# Patient Record
Sex: FEMALE | Race: WHITE | HISPANIC OR LATINO | Employment: UNEMPLOYED | URBAN - METROPOLITAN AREA
[De-identification: names, ages, dates, MRNs, and addresses within clinical notes are randomized per-mention and may not be internally consistent; named-entity substitution may affect disease eponyms.]

---

## 2023-03-15 ENCOUNTER — OFFICE VISIT (OUTPATIENT)
Dept: ORTHOPEDIC SURGERY | Facility: CLINIC | Age: 48
End: 2023-03-15
Payer: OTHER GOVERNMENT

## 2023-03-15 VITALS — OXYGEN SATURATION: 98 % | HEART RATE: 89 BPM | BODY MASS INDEX: 24.96 KG/M2 | HEIGHT: 67 IN | WEIGHT: 159 LBS

## 2023-03-15 DIAGNOSIS — M25.522 PAIN OF BOTH ELBOWS: Primary | ICD-10-CM

## 2023-03-15 DIAGNOSIS — M25.521 PAIN OF BOTH ELBOWS: Primary | ICD-10-CM

## 2023-03-15 PROCEDURE — 99203 OFFICE O/P NEW LOW 30 MIN: CPT | Performed by: STUDENT IN AN ORGANIZED HEALTH CARE EDUCATION/TRAINING PROGRAM

## 2023-03-15 RX ORDER — UREA 10 %
LOTION (ML) TOPICAL
COMMUNITY

## 2023-03-15 RX ORDER — FLUOXETINE 10 MG/1
CAPSULE ORAL
COMMUNITY
Start: 2023-02-23

## 2023-03-15 RX ORDER — MELOXICAM 15 MG/1
TABLET ORAL
COMMUNITY
Start: 2023-02-27 | End: 2023-04-07 | Stop reason: ALTCHOICE

## 2023-03-15 RX ORDER — LAMOTRIGINE 25 MG/1
TABLET ORAL
COMMUNITY
Start: 2023-02-13

## 2023-03-15 NOTE — PROGRESS NOTES
"Chief Complaint  Pain and Initial Evaluation of the Left Elbow and Pain and Initial Evaluation of the Right Elbow    Subjective          Brissa Yousif presents to [facility] for evaluation of bilateral elbow pain.     History of Present Illness    Patient presents to the office for evaluation of bilateral elbow pain. She has a work up on her arm back in 2016 and had a benign bone and she is seeing a specialist for this currently. She reports pain to both the medial and lateral side. She reports the pain has been bothering for about a year. She reports she does a lot of typing due to being in school. She is right hand dominant. She reports she has been taking Mobic for three months and states she doesn't seem to seen any relief.    Allergies   Allergen Reactions   • Amoxicillin-Pot Clavulanate Diarrhea        Social History     Socioeconomic History   • Marital status:    Tobacco Use   • Smoking status: Never     Passive exposure: Yes   • Smokeless tobacco: Never   • Tobacco comments:     Mother smoked throughout childhood   Substance and Sexual Activity   • Alcohol use: Not Currently     Alcohol/week: 1.0 standard drink     Types: 1 Glasses of wine per week     Comment: I drink alcohol maybe once every few months.   • Drug use: Never   • Sexual activity: Yes     Partners: Male     Birth control/protection: I.U.D.        I reviewed the patient's chief complaint, history of present illness, review of systems, past medical history, surgical history, family history, social history, medications, and allergy list.     REVIEW OF SYSTEMS    Constitutional: Denies fevers, chills, weight loss  Cardiovascular: Denies chest pain, shortness of breath  Skin: Denies rashes, acute skin changes  Neurologic: Denies headache, loss of consciousness  MSK: bilateral elbow pain       Objective   Vital Signs:   Pulse 89   Ht 170.2 cm (67\")   Wt 72.1 kg (159 lb)   SpO2 98%   BMI 24.90 kg/m²     Body " mass index is 24.9 kg/m².    Physical Exam    General: Alert. No acute distress.   Right upper extremity: tender over the lateral epicondyle, tender over the common extensor, full elbow range of motion, pain with finger restiricting, pain with finger restricting flexion, full active finger range of motion,     Left upper extremity: tender over the common extensor and lateral epicondyle, full elbow range of motion, pain with finger restaint and flexion, neurovascularly intact, sensation intact, full active finger range of motion.       Procedures    Imaging Results (Most Recent)     None                   Assessment and Plan        No results found.     Diagnoses and all orders for this visit:    1. Pain of both elbows (Primary)        Patient presents to the office for evaluation for bilateral elbow pain. Discussed the risks and benefits of a steroid injection, however, patient wishes to hold on this. Prescription for Voltaren gel will be sent in today and will order formal physical therapy. She will call with any questions or concerns.        Call or return if worsening symptoms.    Scribed for Jeferson Mendoza MD by Thao Basilio  03/15/2023   09:35 EDT         Follow Up       6 weeks     Patient was given instructions and counseling regarding her condition or for health maintenance advice. Please see specific information pulled into the AVS if appropriate.       I have personally performed the services described in this document as scribed by the above individual and it is both accurate and complete.     Jeferson Mendoza MD  03/15/23  09:45 EDT

## 2023-03-27 ENCOUNTER — PATIENT MESSAGE (OUTPATIENT)
Dept: ORTHOPEDIC SURGERY | Facility: CLINIC | Age: 48
End: 2023-03-27
Payer: OTHER GOVERNMENT

## 2023-03-27 DIAGNOSIS — M25.522 PAIN OF BOTH ELBOWS: ICD-10-CM

## 2023-03-27 DIAGNOSIS — M25.521 PAIN OF BOTH ELBOWS: ICD-10-CM

## 2023-04-06 ENCOUNTER — TREATMENT (OUTPATIENT)
Dept: PHYSICAL THERAPY | Facility: CLINIC | Age: 48
End: 2023-04-06
Payer: OTHER GOVERNMENT

## 2023-04-06 DIAGNOSIS — M25.521 BILATERAL ELBOW JOINT PAIN: Primary | ICD-10-CM

## 2023-04-06 DIAGNOSIS — R20.2 TINGLING: ICD-10-CM

## 2023-04-06 DIAGNOSIS — M25.522 BILATERAL ELBOW JOINT PAIN: Primary | ICD-10-CM

## 2023-04-06 PROCEDURE — 97165 OT EVAL LOW COMPLEX 30 MIN: CPT | Performed by: OCCUPATIONAL THERAPIST

## 2023-04-06 PROCEDURE — 97110 THERAPEUTIC EXERCISES: CPT | Performed by: OCCUPATIONAL THERAPIST

## 2023-04-06 NOTE — PROGRESS NOTES
"Occupational Therapy Initial Evaluation and Plan of Care  [place]  OT: [address] [place] [address]    Patient: Brissa Yousif   : 1975  Diagnosis/ICD-10 Code:  Bilateral elbow joint pain [M25.521, M25.522]  Referring practitioner: Jeferson Mendoza MD  Date of Initial Visit: 2023  Today's Date: 2023  Patient seen for 1 sessions           Subjective Questionnaire: QuickDASH:       Subjective Evaluation    History of Present Illness  Mechanism of injury: Pt is a RHD 48 y/o female who presents with B elbow pain and tingling in ulnar side of B hands. Pt reports symptoms have been ongoing off and on for approximately 5 years with symptoms \"getting angry\" within the past year. Pt reports good relief with Voltaren gel. Pt also reports completing yoga with some relief. Pt is currently in grad school.    Pain  Current pain ratin  At best pain ratin  At worst pain ratin  Location: B elbows  Quality: burning  Relieving factors: medications, change in position and rest  Aggravating factors: repetitive movement and prolonged positioning  Progression: improved    Social Support  Lives with: spouse    Hand dominance: right    Diagnostic Tests  X-ray: normal    Treatments  Previous treatment: medication  Current treatment: medication  Patient Goals  Patient goals for therapy: decreased pain, increased strength, independence with ADLs/IADLs and return to sport/leisure activities  Patient goal: \"I would just like to get to a point where ideally less pain more function\"           Objective          Tenderness     Left Elbow   Tenderness in the lateral epicondyle and medial epicondyle.     Right Elbow   Tenderness in the lateral epicondyle and medial epicondyle.     Left Wrist/Hand   Tenderness in the lateral epicondyle and medial epicondyle.     Right Wrist/Hand   Tenderness in the lateral epicondyle and medial epicondyle.     Neurological Testing     Additional Neurological Details  Pt scored WNL " "on monofilament testing but reports occasional tingling in B small fingers that is position dependent.    Active Range of Motion   Left Shoulder   Normal active range of motion    Right Shoulder   Normal active range of motion    Left Elbow   Normal active range of motion    Right Elbow   Normal active range of motion    Left Wrist   Normal active range of motion    Right Wrist   Normal active range of motion    Left Thumb   Opposition: Full opposition to base of small finger.    Right Thumb   Opposition: Full opposition to base of small finger.    Strength/Myotome Testing     Left Shoulder   Normal muscle strength    Right Shoulder   Normal muscle strength    Left Elbow   Normal strength    Right Elbow   Normal strength    Left Wrist/Hand   Normal wrist strength     (2nd hand position)     Trial 1: 66 lbs    Trial 2: 65 lbs    Trial 3: 63 lbs    Average: 64.67 lbs    Right Wrist/Hand   Normal wrist strength     (2nd hand position)     Trial 1: 66 lbs    Trial 2: 65 lbs    Trial 3: 63 lbs    Average: 64.67 lbs    Additional Strength Details  Eval elbows ext  R: 69, 71, 63 (67)  L: 69, 65, 61 (65)    Tests     Left Shoulder   Positive Jewel.     Left Elbow   Positive elbow flexion.     Right Elbow   Positive elbow flexion.     Left Wrist/Hand   Negative resisted middle finger and Tinel's sign (medial nerve).     Right Wrist/Hand   Positive resisted middle finger.   Negative Tinel's sign (medial nerve).     Additional Tests Details  20 seconds pt reports small finger \"started to get a little bit less sensation in it\"  Symptoms present after 1 min on both R and L sides. Pt reports change in sensation to B small fingers.          Assessment & Plan     Assessment  Impairments: activity intolerance, impaired physical strength, lacks appropriate home exercise program and pain with function  Functional Limitations: carrying objects, lifting, sleeping, pulling, pushing and uncomfortable because of pain  Assessment " details: Pt will benefit from skilled OT secondary to decreased strength/ROM and increased pain that limits pt ability to complete ADLs.  Prognosis: good    Goals  Plan Goals: 1.  Patient complains of pain in B elbows.  LTG 1  12 weeks:  Decrease B elbow pain to 1/10 with functional use to improve ADL status.  Status:  New  STG 1  8 weeks:  Decrease B elbow pain to 3/10 with functional use.  Status:  New    2.  Carrying, moving, and handling objects functional limitation.  LTG 2  12 weeks:  The patient will demonstrate 1-19 % limitation by achieving a score of 15/55 on the Quick DASH.  Status: New  STG 2  8 weeks:  The patient will demonstrate 1-19 % limitation by achieving a score of 19/55 on the Quick DASH.  Status:  New    Plan  Planned modality interventions: cryotherapy and thermotherapy (hydrocollator packs)  Planned therapy interventions: body mechanics training, fine motor coordination training, flexibility, functional ROM exercises, home exercise program, joint mobilization, manual therapy, neuromuscular re-education, postural training, soft tissue mobilization, strengthening, stretching and therapeutic activities  Frequency: 2x week  Duration in weeks: 12  Treatment plan discussed with: patient      Pt is indicated for skilled occupational therapy services.  Timed:            Therapeutic Exercise:    8     mins  16050;     Therapeutic Activity:     4     mins  42506;       Un-Timed:  Low Eval     40     Mins  52955    Timed Treatment:   12   mins   Total Treatment:     52   mins    OT SIGNATURE: Carlos Olsen OT   DATE TREATMENT INITIATED: 4/6/2023  [place] License: [id number]    Initial Certification  Certification Period: 7/4/2023  I certify that the therapy services are furnished while this patient is under my care.  The services outlined above are required by this patient, and will be reviewed every 90 days.     PHYSICIAN: Jeferson Mendoza MD      DATE:   MD NPI: [id number]  Please sign and return via fax to    [phone].. Thank you, [facility].

## 2023-04-07 NOTE — TELEPHONE ENCOUNTER
From: Brissa Yousif  To: Jeferson Mendoza  Sent: 3/27/2023 10:39 AM EDT  Subject: Diclofenac    Hi Dr. Mendoza--I just wanted to make sure that I was not supposed to be taking the mobic with the diclofenac topical gel. I have found that the gel is actually helping more than I expected--does this mean that it is more likely to help if I take it orally too? If so, I may want to switch to that instead.     Thank you,    Brissa

## 2023-04-07 NOTE — TELEPHONE ENCOUNTER
GOOD MORNING JES SCRIPT FOR ORAL DICLOFENAC AND GEL SENT TO YOUR PHARMACY. PLEASE STOP THE MELOXICAM BEFORE STARTING THE ORAL DICLOFENAC.

## 2023-04-10 ENCOUNTER — TREATMENT (OUTPATIENT)
Dept: PHYSICAL THERAPY | Facility: CLINIC | Age: 48
End: 2023-04-10
Payer: OTHER GOVERNMENT

## 2023-04-10 DIAGNOSIS — M25.522 BILATERAL ELBOW JOINT PAIN: Primary | ICD-10-CM

## 2023-04-10 DIAGNOSIS — M25.521 BILATERAL ELBOW JOINT PAIN: Primary | ICD-10-CM

## 2023-04-10 DIAGNOSIS — R20.2 TINGLING: ICD-10-CM

## 2023-04-10 PROCEDURE — 97110 THERAPEUTIC EXERCISES: CPT | Performed by: OCCUPATIONAL THERAPIST

## 2023-04-10 PROCEDURE — 97530 THERAPEUTIC ACTIVITIES: CPT | Performed by: OCCUPATIONAL THERAPIST

## 2023-04-10 NOTE — PROGRESS NOTES
Occupational Therapy Daily Treatment Note  [place] OT: [address] [place] [address]      Patient: Brissa Yousif   : 1975  Diagnosis/ICD-10 Code:  Bilateral elbow joint pain [M25.521, M25.522]  Referring practitioner: Jeferson Mendoza MD  Date of Initial Visit: Type: THERAPY  Noted: 2023  Today's Date: 4/10/2023  Patient seen for 2 sessions             Subjective   Brissa Yousif reports: 3-4/ pain B elbows.    Objective     See Exercise, Manual, and Modality Logs for complete treatment.       Assessment/Plan  Pt reports temporary exercise related soreness in forearms but tolerated treatment well.  Progress per Plan of Care           Timed:  Therapeutic Exercise:    30     mins  19423;     Therapeutic Activity:     8     mins  46188;       Timed Treatment:   38   mins   Total Treatment:     38   mins        Carlos Olsen OT  Occupational Therapist  [place] License: [id number]  NPI: [id number]

## 2023-04-12 ENCOUNTER — TREATMENT (OUTPATIENT)
Dept: PHYSICAL THERAPY | Facility: CLINIC | Age: 48
End: 2023-04-12
Payer: OTHER GOVERNMENT

## 2023-04-12 DIAGNOSIS — M25.521 BILATERAL ELBOW JOINT PAIN: Primary | ICD-10-CM

## 2023-04-12 DIAGNOSIS — R20.2 TINGLING: ICD-10-CM

## 2023-04-12 DIAGNOSIS — M25.522 BILATERAL ELBOW JOINT PAIN: Primary | ICD-10-CM

## 2023-04-12 PROCEDURE — 97530 THERAPEUTIC ACTIVITIES: CPT | Performed by: OCCUPATIONAL THERAPIST

## 2023-04-12 PROCEDURE — 97110 THERAPEUTIC EXERCISES: CPT | Performed by: OCCUPATIONAL THERAPIST

## 2023-04-12 NOTE — PROGRESS NOTES
"Occupational Therapy Daily Treatment Note  [place] OT: [address] [place] [address]      Patient: Brissa Yousif   : 1975  Diagnosis/ICD-10 Code:  Bilateral elbow joint pain [M25.521, M25.522]  Referring practitioner: Jeferson Mendoza MD  Date of Initial Visit: Type: THERAPY  Noted: 2023  Today's Date: 2023  Patient seen for 3 sessions             Subjective   Brissa Yousif reports: 5/10 pain in R elbow. The L elbow is \"maybe a 1 if anything right now\"    Objective     See Exercise, Manual, and Modality Logs for complete treatment.       Assessment/Plan  Pt tolerated well. Pt displays improved strength this date.  Progress per Plan of Care           Timed:  Therapeutic Exercise:    30     mins  47549;      Therapeutic Activity:     8     mins  01608;       Timed Treatment:   38   mins   Total Treatment:     38   mins        Carlos Olsen OT  Occupational Therapist  [place] License: [id number]  NPI: [id number]  "

## 2023-04-18 ENCOUNTER — TREATMENT (OUTPATIENT)
Dept: PHYSICAL THERAPY | Facility: CLINIC | Age: 48
End: 2023-04-18
Payer: OTHER GOVERNMENT

## 2023-04-18 DIAGNOSIS — M25.522 BILATERAL ELBOW JOINT PAIN: Primary | ICD-10-CM

## 2023-04-18 DIAGNOSIS — M25.521 BILATERAL ELBOW JOINT PAIN: Primary | ICD-10-CM

## 2023-04-18 DIAGNOSIS — R20.2 TINGLING: ICD-10-CM

## 2023-04-18 PROCEDURE — 97110 THERAPEUTIC EXERCISES: CPT | Performed by: OCCUPATIONAL THERAPIST

## 2023-04-18 PROCEDURE — 97140 MANUAL THERAPY 1/> REGIONS: CPT | Performed by: OCCUPATIONAL THERAPIST

## 2023-04-18 NOTE — PROGRESS NOTES
"Occupational Therapy Daily Treatment Note  [place] OT: [address] [place] [address]      Patient: Brissa Yousif   : 1975  Diagnosis/ICD-10 Code:  Bilateral elbow joint pain [M25.521, M25.522]  Referring practitioner: Jeferson Mendoza MD  Date of Initial Visit: Type: THERAPY  Noted: 2023  Today's Date: 2023  Patient seen for 4 sessions             Subjective   Brissa Yousif reports: \"They're a little sore today. They've been sore since the 2 pounders\" R elbow 5/10 L elbow 3/10.    Objective     See Exercise, Manual, and Modality Logs for complete treatment.       Assessment/Plan  Pt tolerated well. OT educated pt on friction massage to encourage proper healing of wrist extensors. Teachback successful.   Progress per Plan of Care           Timed:  Manual Therapy:    10     mins  69914;  Therapeutic Exercise:    15     mins  07703;         Timed Treatment:   25   mins   Total Treatment:     25   mins        Carlos Olsen OT  Occupational Therapist  [place] License: [id number]  NPI: [id number]  "

## 2023-04-21 ENCOUNTER — TREATMENT (OUTPATIENT)
Dept: PHYSICAL THERAPY | Facility: CLINIC | Age: 48
End: 2023-04-21
Payer: OTHER GOVERNMENT

## 2023-04-21 DIAGNOSIS — M25.521 BILATERAL ELBOW JOINT PAIN: Primary | ICD-10-CM

## 2023-04-21 DIAGNOSIS — M25.522 BILATERAL ELBOW JOINT PAIN: Primary | ICD-10-CM

## 2023-04-21 DIAGNOSIS — R20.2 TINGLING: ICD-10-CM

## 2023-04-21 PROCEDURE — 97530 THERAPEUTIC ACTIVITIES: CPT | Performed by: OCCUPATIONAL THERAPIST

## 2023-04-21 PROCEDURE — 97140 MANUAL THERAPY 1/> REGIONS: CPT | Performed by: OCCUPATIONAL THERAPIST

## 2023-04-21 PROCEDURE — 97110 THERAPEUTIC EXERCISES: CPT | Performed by: OCCUPATIONAL THERAPIST

## 2023-04-21 NOTE — PROGRESS NOTES
Occupational Therapy Daily Treatment Note  [place] OT: [address] [place] [address]      Patient: Brissa Yousif   : 1975  Diagnosis/ICD-10 Code:  Bilateral elbow joint pain [M25.521, M25.522]  Referring practitioner: Jeferson Mendoza MD  Date of Initial Visit: Type: THERAPY  Noted: 2023  Today's Date: 2023  Patient seen for 5 sessions             Subjective   Brissa Yousif reports: 2/10 pain R elbow. No current pain L elbow.     Objective     See Exercise, Manual, and Modality Logs for complete treatment.       Assessment/Plan  Pt tolerated strengthening well this date. Ktape applied to R lateral elbow. Education provided on safe removal of Ktape.  Progress per Plan of Care           Timed:  Manual Therapy:    16     mins  58519;  Therapeutic Exercise:    14     mins  78762;      Therapeutic Activity:     8     mins  37372;       Timed Treatment:   38   mins   Total Treatment:     38   mins        Carlos Olsen OT  Occupational Therapist  [place] License: [id number]  NPI: [id number]

## 2023-04-25 ENCOUNTER — TREATMENT (OUTPATIENT)
Dept: PHYSICAL THERAPY | Facility: CLINIC | Age: 48
End: 2023-04-25
Payer: OTHER GOVERNMENT

## 2023-04-25 DIAGNOSIS — M25.521 BILATERAL ELBOW JOINT PAIN: Primary | ICD-10-CM

## 2023-04-25 DIAGNOSIS — M25.522 BILATERAL ELBOW JOINT PAIN: Primary | ICD-10-CM

## 2023-04-25 DIAGNOSIS — R20.2 TINGLING: ICD-10-CM

## 2023-04-25 PROCEDURE — 97140 MANUAL THERAPY 1/> REGIONS: CPT | Performed by: OCCUPATIONAL THERAPIST

## 2023-04-25 PROCEDURE — 97110 THERAPEUTIC EXERCISES: CPT | Performed by: OCCUPATIONAL THERAPIST

## 2023-04-25 NOTE — PROGRESS NOTES
Occupational Therapy Daily Treatment Note  [place] OT: [address] [place] [address]      Patient: Brissa Yousif   : 1975  Diagnosis/ICD-10 Code:  Bilateral elbow joint pain [M25.521, M25.522]  Referring practitioner: Jeferson Mendoza MD  Date of Initial Visit: Type: THERAPY  Noted: 2023  Today's Date: 2023  Patient seen for 6 sessions             Subjective   Brissa Yousif reports: 3-4/10 pain R elbow.    Objective     See Exercise, Manual, and Modality Logs for complete treatment.       Assessment/Plan  Good tolerance to exercises this date. OT discussed adding wrist 3 way strengthening to HEP.  Progress per Plan of Care           Timed:  Manual Therapy:    10     mins  84920;  Therapeutic Exercise:    20     mins  96349;       Timed Treatment:   30   mins   Total Treatment:     30   mins        Carlos Olsen OT  Occupational Therapist  [place] License: [id number]  NPI: [id number]

## 2023-04-27 ENCOUNTER — TREATMENT (OUTPATIENT)
Dept: PHYSICAL THERAPY | Facility: CLINIC | Age: 48
End: 2023-04-27
Payer: OTHER GOVERNMENT

## 2023-04-27 DIAGNOSIS — M25.522 BILATERAL ELBOW JOINT PAIN: Primary | ICD-10-CM

## 2023-04-27 DIAGNOSIS — R20.2 TINGLING: ICD-10-CM

## 2023-04-27 DIAGNOSIS — M25.521 BILATERAL ELBOW JOINT PAIN: Primary | ICD-10-CM

## 2023-04-27 PROCEDURE — 97110 THERAPEUTIC EXERCISES: CPT | Performed by: OCCUPATIONAL THERAPIST

## 2023-04-27 PROCEDURE — 97530 THERAPEUTIC ACTIVITIES: CPT | Performed by: OCCUPATIONAL THERAPIST

## 2023-04-27 NOTE — PROGRESS NOTES
Occupational Therapy Daily Treatment Note  [place] OT: [address] [place] [address]      Patient: Brissa Yousif   : 1975  Diagnosis/ICD-10 Code:  Bilateral elbow joint pain [M25.521, M25.522]  Referring practitioner: Jeferson Mendoza MD  Date of Initial Visit: Type: THERAPY  Noted: 2023  Today's Date: 2023  Patient seen for 7 sessions             Subjective   Brissa Yousif reports: 1-2/10 pain R elbow. Pt reports symptoms are improving.    Objective     See Exercise, Manual, and Modality Logs for complete treatment.       Assessment/Plan  Pt reports exercise related soreness with wrist 3 way but tolerated remainder of treatment well.  Progress per Plan of Care           Timed:  Therapeutic Exercise:    17     mins  74203;       Therapeutic Activity:     8     mins  59142;       Timed Treatment:   25   mins   Total Treatment:     25   mins        Carlos Olsen OT  Occupational Therapist  [place] License: [id number]  NPI: [id number]

## 2023-05-01 ENCOUNTER — TREATMENT (OUTPATIENT)
Dept: PHYSICAL THERAPY | Facility: CLINIC | Age: 48
End: 2023-05-01
Payer: OTHER GOVERNMENT

## 2023-05-01 DIAGNOSIS — M25.521 BILATERAL ELBOW JOINT PAIN: Primary | ICD-10-CM

## 2023-05-01 DIAGNOSIS — M25.522 BILATERAL ELBOW JOINT PAIN: Primary | ICD-10-CM

## 2023-05-01 DIAGNOSIS — R20.2 TINGLING: ICD-10-CM

## 2023-05-01 PROCEDURE — 97110 THERAPEUTIC EXERCISES: CPT | Performed by: OCCUPATIONAL THERAPIST

## 2023-05-01 PROCEDURE — 97530 THERAPEUTIC ACTIVITIES: CPT | Performed by: OCCUPATIONAL THERAPIST

## 2023-05-01 PROCEDURE — 97140 MANUAL THERAPY 1/> REGIONS: CPT | Performed by: OCCUPATIONAL THERAPIST

## 2023-05-01 NOTE — PROGRESS NOTES
OT Re-evaluation/Progress Note  [place]  OT: [address]  [place] [address]    Patient: Brissa Yousif   : 1975  Diagnosis/ICD-10 Code:  Bilateral elbow joint pain [M25.521, M25.522]  Referring practitioner: Jeferson Mendoza MD  Date of Initial Visit: Type: THERAPY  Noted: 2023  Today's Date: 2023  Patient seen for 8 sessions      Subjective:   Subjective Questionnaire: QuickDASH:   Clinical Progress: improved  Home Program Compliance: Yes  Treatment has included: therapeutic exercise, manual therapy and therapeutic activity    Subjective Evaluation    Pain  Current pain ratin  Location: R elbow         Objective          Tenderness     Left Elbow   Tenderness in the medial epicondyle. No tenderness in the lateral epicondyle.     Right Elbow   Tenderness in the lateral epicondyle and medial epicondyle.     Left Wrist/Hand   Tenderness in the medial epicondyle. No tenderness in the lateral epicondyle.     Right Wrist/Hand   Tenderness in the lateral epicondyle and medial epicondyle.     Neurological Testing     Additional Neurological Details  Pt scored WNL on monofilament testing but reports occasional tingling in B small fingers that is position dependent.    Active Range of Motion   Left Shoulder   Normal active range of motion    Right Shoulder   Normal active range of motion    Left Elbow   Normal active range of motion    Right Elbow   Normal active range of motion    Left Wrist   Normal active range of motion    Right Wrist   Normal active range of motion    Left Thumb   Opposition: Full opposition to base of small finger.    Right Thumb   Opposition: Full opposition to base of small finger.    Strength/Myotome Testing     Left Shoulder   Normal muscle strength    Right Shoulder   Normal muscle strength    Left Elbow   Normal strength    Right Elbow   Normal strength    Left Wrist/Hand   Normal wrist strength     (2nd hand position)     Trial 1: 75 lbs    Trial 2: 75 lbs    Trial  "3: 70 lbs    Average: 73.33 lbs    Right Wrist/Hand   Normal wrist strength     (2nd hand position)     Trial 1: 75 lbs    Trial 2: 70 lbs    Trial 3: 66 lbs    Average: 70.33 lbs    Additional Strength Details  Eval elbows ext  R: 69, 71, 63 (67)  L: 69, 65, 61 (65)    Tests     Left Shoulder   Positive Jewel.     Left Elbow   Positive elbow flexion.     Left Wrist/Hand   Negative resisted middle finger and Tinel's sign (medial nerve).     Right Wrist/Hand   Positive resisted middle finger.   Negative Tinel's sign (medial nerve).     Additional Tests Details  20 seconds pt reports small finger \"started to get a little bit less sensation in it\"  Symptoms present after 1 min on  L side. Pt reports change in sensation to small finger.      Assessment & Plan     Assessment  Impairments: activity intolerance, impaired physical strength, lacks appropriate home exercise program and pain with function  Functional Limitations: carrying objects, lifting, sleeping, pulling, pushing and uncomfortable because of pain  Assessment details: Pt displays improved strength and decreased maximum pain compared to eval. Pt also displays improvement with elbow flexion test on the R elbow. Pt also reports less disability as shown by improved QuickDASH score. Pt continues with pain with functional use that limits ability to complete ADLs/IADLs.  Prognosis: good    Goals  Plan Goals: 1.  Patient complains of pain in B elbows.  LTG 1  12 weeks:  Decrease B elbow pain to 1/10 with functional use to improve ADL status.  Status:  New  STG 1  8 weeks:  Decrease B elbow pain to 3/10 with functional use.  Status:  New    2.  Carrying, moving, and handling objects functional limitation.  LTG 2  12 weeks:  The patient will demonstrate 1-19 % limitation by achieving a score of 15/55 on the Quick DASH.  Status: New  STG 2  8 weeks:  The patient will demonstrate 1-19 % limitation by achieving a score of 19/55 on the Quick DASH.  Status:  " New    Plan  Planned modality interventions: cryotherapy and thermotherapy (hydrocollator packs)  Planned therapy interventions: body mechanics training, fine motor coordination training, flexibility, functional ROM exercises, home exercise program, joint mobilization, manual therapy, neuromuscular re-education, postural training, soft tissue mobilization, strengthening, stretching and therapeutic activities  Frequency: 2x week  Duration in weeks: 12  Treatment plan discussed with: patient      Progress toward previous goals: Partially Met      Recommendations: Continue as planned  Timeframe: 2 weeks  Prognosis to achieve goals: good    OT SIGNATURE: Carlos Olsen OT   DATE TREATMENT INITIATED: Type: THERAPY  Noted: 4/6/2023  [place] License: [id number]    Based upon review of the patient's progress and continued therapy plan, it is my medical opinion that Brissa Yousif should continue physical therapy treatment at [facility]  [address] [place] [id number] [phone].    Signature: __________________________________  Jeferson Mendoza MD MD NPI: [id number]  Timed:  Manual Therapy:    8     mins  52006;  Therapeutic Exercise:    22     mins  96839;       Therapeutic Activity:     8     mins  96243;       Timed Treatment:   38   mins   Total Treatment:     38   mins  Please sign and return via fax to [phone]  . Thank you, [facility].

## 2023-05-01 NOTE — LETTER
[place]  OT: [address] [facility]  [place] [address]    Patient: Brissa Yousif   : 1975  Diagnosis/ICD-10 Code:  Bilateral elbow joint pain [M25.521, M25.522]  Referring practitioner: Jeferson Mendoza MD  Date of Initial Visit: Type: THERAPY  Noted: 2023  Today's Date: 2023  Patient seen for 8 sessions    Pt improved  strength from 64 lbs in each hand to 73 lbs in the L and 70 lbs in the R. Pt no longer displays positive elbow flexion test in R but continues with positive test in L. Pt reports less disability by improved QuickDASH score and pt reports pain at worst in the past week has been a 4-/10 (eval was 8/10). Please advise after follow up. Thank you.    Subjective Questionnaire: QuickDASH:   Clinical Progress: improved  Home Program Compliance: Yes  Treatment has included: therapeutic exercise, manual therapy and therapeutic activity    Subjective Evaluation    Pain  Current pain ratin  Location: R elbow         Objective          Tenderness     Left Elbow   Tenderness in the medial epicondyle. No tenderness in the lateral epicondyle.     Right Elbow   Tenderness in the lateral epicondyle and medial epicondyle.     Left Wrist/Hand   Tenderness in the medial epicondyle. No tenderness in the lateral epicondyle.     Right Wrist/Hand   Tenderness in the lateral epicondyle and medial epicondyle.     Neurological Testing     Additional Neurological Details  Pt scored WNL on monofilament testing but reports occasional tingling in B small fingers that is position dependent.    Active Range of Motion   Left Shoulder   Normal active range of motion    Right Shoulder   Normal active range of motion    Left Elbow   Normal active range of motion    Right Elbow   Normal active range of motion    Left Wrist   Normal active range of motion    Right Wrist   Normal active range of motion    Left Thumb   Opposition: Full opposition to base of small finger.    Right Thumb   Opposition: Full opposition to  "base of small finger.    Strength/Myotome Testing     Left Shoulder   Normal muscle strength    Right Shoulder   Normal muscle strength    Left Elbow   Normal strength    Right Elbow   Normal strength    Left Wrist/Hand   Normal wrist strength     (2nd hand position)     Trial 1: 75 lbs    Trial 2: 75 lbs    Trial 3: 70 lbs    Average: 73.33 lbs    Right Wrist/Hand   Normal wrist strength     (2nd hand position)     Trial 1: 75 lbs    Trial 2: 70 lbs    Trial 3: 66 lbs    Average: 70.33 lbs    Additional Strength Details  Eval elbows ext  R: 69, 71, 63 (67)  L: 69, 65, 61 (65)    Tests     Left Shoulder   Positive Jewel.     Left Elbow   Positive elbow flexion.     Left Wrist/Hand   Negative resisted middle finger and Tinel's sign (medial nerve).     Right Wrist/Hand   Positive resisted middle finger.   Negative Tinel's sign (medial nerve).     Additional Tests Details  20 seconds pt reports small finger \"started to get a little bit less sensation in it\"  Symptoms present after 1 min on  L side. Pt reports change in sensation to small finger.     "

## 2023-05-03 ENCOUNTER — OFFICE VISIT (OUTPATIENT)
Dept: ORTHOPEDIC SURGERY | Facility: CLINIC | Age: 48
End: 2023-05-03
Payer: OTHER GOVERNMENT

## 2023-05-03 VITALS — WEIGHT: 159 LBS | BODY MASS INDEX: 24.96 KG/M2 | HEIGHT: 67 IN

## 2023-05-03 DIAGNOSIS — M25.521 PAIN OF BOTH ELBOWS: Primary | ICD-10-CM

## 2023-05-03 DIAGNOSIS — M25.522 PAIN OF BOTH ELBOWS: Primary | ICD-10-CM

## 2023-05-03 RX ORDER — ERGOCALCIFEROL 1.25 MG/1
50000 CAPSULE ORAL
COMMUNITY

## 2023-05-03 RX ORDER — MAGNESIUM CITRATE
POWDER (GRAM) MISCELLANEOUS
COMMUNITY
Start: 2018-01-01

## 2023-05-03 RX ORDER — LAMOTRIGINE 150 MG/1
TABLET ORAL
COMMUNITY
Start: 2023-05-02

## 2023-05-03 NOTE — PROGRESS NOTES
Chief Complaint  Follow-up of the Right Elbow and Follow-up of the Left Elbow    Subjective          Brissa Yousif presents to [facility]   History of Present Illness    Brissa Yousif presents today for a follow-up of her elbows.  Patient reports bilateral elbow pain that we are treating conservatively.  Today, patient states she is doing okay.  She states that her elbows are better but they are still limiting.  She has been attending physical therapy.  She reports fluctuations with her pain.  She states that there is a line if she does not do exercises she experiences pain if she overdoes exercises she experiences pain.  She states that overall her strength is improving.  She has no significant relief with the Voltaren gel.  She is continue with diclofenac.  She reports no new injuries.  Patient states that she is going to be moving to another [place] 2 weeks.  She affirms bilateral upper extremity numbness and tingling at times.      Allergies   Allergen Reactions   • Amoxicillin-Pot Clavulanate Diarrhea        Social History     Socioeconomic History   • Marital status:    Tobacco Use   • Smoking status: Never     Passive exposure: Yes   • Smokeless tobacco: Never   • Tobacco comments:     Mother smoked throughout childhood   Substance and Sexual Activity   • Alcohol use: Not Currently     Alcohol/week: 1.0 standard drink     Types: 1 Glasses of wine per week     Comment: I drink alcohol maybe once every few months.   • Drug use: Never   • Sexual activity: Yes     Partners: Male     Birth control/protection: I.U.D.        I reviewed the patient's chief complaint, history of present illness, review of systems, past medical history, surgical history, family history, social history, medications, and allergy list.     REVIEW OF SYSTEMS    Constitutional: Denies fevers, chills, weight loss  Cardiovascular: Denies chest pain, shortness of breath  Skin: Denies rashes, acute skin  "changes  Neurologic: Denies headache, loss of consciousness  MSK: Right elbow pain.  Left elbow pain.      Objective   Vital Signs:   Ht 170.2 cm (67\")   Wt 72.1 kg (159 lb)   BMI 24.90 kg/m²     Body mass index is 24.9 kg/m².    Physical Exam    General: Alert. No acute distress.   Right upper extremity: Tenderness to palpation of the lateral epicondyle.  Tenderness to the common extensor.  Elbow stable to varus and valgus stress.  Full elbow range of motion.  Forearm soft.  Active finger and wrist range of motion.  Thumb opposition intact.  Palmar abduction of the thumb intact.  Sensation intact to the median, radial, and ulnar nerve distributions.  Palpable radial pulse.  90 pronation.  90 supination.    Left upper extremity: Tenderness to palpation of the lateral epicondyle.  Tenderness to the common extensor.  Full elbow range of motion.  Elbow stable to varus and valgus stress.  Forearm soft.  90 pronation.  90 supination.  Active finger and wrist range of motion.  Thumb opposition intact.  Palmar abduction of the thumb intact.  Sensation intact to the median, radial, and ulnar nerve distributions.  Palpable radial pulse.    Procedures    Imaging Results (Most Recent)     None                 Assessment and Plan    Diagnoses and all orders for this visit:    1. Pain of both elbows (Primary)  -     diclofenac (VOLTAREN) 50 MG EC tablet; Take 1 tablet by mouth 2 (Two) Times a Day.  Dispense: 60 tablet; Refill: 0  -     Diclofenac Sodium (VOLTAREN) 1 % gel gel; Apply 4 g topically to the appropriate area as directed 4 (Four) Times a Day.  Dispense: 150 g; Refill: 1        Brissa Yousif presents today for follow-up of bilateral elbow pain that we have treated conservatively.  Patient instructed to continue with formal physical therapy as well as her home exercises.  We again discussed steroid injections with patient elected to hold off at this time.  Continue with activity modification as needed.  Voltaren gel " was reordered today.  Continue with diclofenac.      Patient elected to follow-up as needed as she will be moving to another state in 2 weeks.    Call or return if symptoms worsen or patient has any concerns.       Follow Up   Return if symptoms worsen or fail to improve.  Patient was given instructions and counseling regarding her condition or for health maintenance advice. Please see specific information pulled into the AVS if appropriate.     Lory Gannon PA-C  05/03/23  13:23 EDT

## 2023-05-05 ENCOUNTER — TREATMENT (OUTPATIENT)
Dept: PHYSICAL THERAPY | Facility: CLINIC | Age: 48
End: 2023-05-05
Payer: OTHER GOVERNMENT

## 2023-05-05 DIAGNOSIS — M25.522 BILATERAL ELBOW JOINT PAIN: Primary | ICD-10-CM

## 2023-05-05 DIAGNOSIS — M25.521 BILATERAL ELBOW JOINT PAIN: Primary | ICD-10-CM

## 2023-05-05 DIAGNOSIS — R20.2 TINGLING: ICD-10-CM

## 2023-05-05 PROCEDURE — 97110 THERAPEUTIC EXERCISES: CPT | Performed by: OCCUPATIONAL THERAPIST

## 2023-05-05 PROCEDURE — 97530 THERAPEUTIC ACTIVITIES: CPT | Performed by: OCCUPATIONAL THERAPIST

## 2023-05-05 NOTE — PROGRESS NOTES
"Occupational Therapy Daily Treatment/Discharge Note  [place] OT: [address] [place] [address]      Patient: Brissa Yousif   : 1975  Diagnosis/ICD-10 Code:  Bilateral elbow joint pain [M25.521, M25.522]  Referring practitioner: Jeferson Mendoza MD  Date of Initial Visit: Type: THERAPY  Noted: 2023  Today's Date: 2023  Patient seen for 9 sessions             Subjective   Brissa Yousif reports: \"They're doing decent. They're still a little sore\" Pt reports continued occasional numbness in pinky. 1-2 R elbow pain. 0 pain L elbow.    Objective          Tenderness     Left Elbow   Tenderness in the medial epicondyle. No tenderness in the lateral epicondyle.     Right Elbow   Tenderness in the lateral epicondyle and medial epicondyle.     Left Wrist/Hand   Tenderness in the medial epicondyle. No tenderness in the lateral epicondyle.     Right Wrist/Hand   Tenderness in the lateral epicondyle and medial epicondyle.     Neurological Testing     Additional Neurological Details  Pt scored WNL on monofilament testing but reports occasional tingling in B small fingers that is position dependent.    Active Range of Motion   Left Shoulder   Normal active range of motion    Right Shoulder   Normal active range of motion    Left Elbow   Normal active range of motion    Right Elbow   Normal active range of motion    Left Wrist   Normal active range of motion    Right Wrist   Normal active range of motion    Left Thumb   Opposition: Full opposition to base of small finger.    Right Thumb   Opposition: Full opposition to base of small finger.    Strength/Myotome Testing     Left Shoulder   Normal muscle strength    Right Shoulder   Normal muscle strength    Left Elbow   Normal strength    Right Elbow   Normal strength    Left Wrist/Hand   Normal wrist strength     (2nd hand position)     Trial 1: 75 lbs    Trial 2: 75 lbs    Trial 3: 70 lbs    Average: 73.33 lbs    Right Wrist/Hand   Normal wrist " "strength     (2nd hand position)     Trial 1: 75 lbs    Trial 2: 70 lbs    Trial 3: 66 lbs    Average: 70.33 lbs    Additional Strength Details  Eval elbows ext  R: 69, 71, 63 (67)  L: 69, 65, 61 (65)    Tests     Left Shoulder   Positive Jewel.     Left Elbow   Positive elbow flexion.     Left Wrist/Hand   Negative resisted middle finger and Tinel's sign (medial nerve).     Right Wrist/Hand   Positive resisted middle finger.   Negative Tinel's sign (medial nerve).     Additional Tests Details  20 seconds pt reports small finger \"started to get a little bit less sensation in it\"  Symptoms present after 1 min on  L side. Pt reports change in sensation to small finger.        See Exercise, Manual, and Modality Logs for complete treatment.       Assessment & Plan     Assessment  Impairments: activity intolerance, impaired physical strength, lacks appropriate home exercise program and pain with function  Functional Limitations: carrying objects, lifting, sleeping, pulling, pushing and uncomfortable because of pain  Assessment details: Pt displays improved strength and decreased maximum pain compared to eval. Pt also displays improvement with elbow flexion test on the R elbow. Pt also reports less disability as shown by improved QuickDASH score. Pt continues with pain with functional use that limits ability to complete ADLs/IADLs.  Prognosis: good    Goals  Plan Goals: 1.  Patient complains of pain in B elbows.  LTG 1  12 weeks:  Decrease B elbow pain to 1/10 with functional use to improve ADL status.  Status:  New  STG 1  8 weeks:  Decrease B elbow pain to 3/10 with functional use.  Status:  New    2.  Carrying, moving, and handling objects functional limitation.  LTG 2  12 weeks:  The patient will demonstrate 1-19 % limitation by achieving a score of 15/55 on the Quick DASH.  Status: New  STG 2  8 weeks:  The patient will demonstrate 1-19 % limitation by achieving a score of 19/55 on the Quick DASH.  Status:  " New    Plan  Planned modality interventions: cryotherapy and thermotherapy (hydrocollator packs)  Planned therapy interventions: body mechanics training, fine motor coordination training, flexibility, functional ROM exercises, home exercise program, joint mobilization, manual therapy, neuromuscular re-education, postural training, soft tissue mobilization, strengthening, stretching and therapeutic activities  Frequency: 2x week  Duration in weeks: 12  Treatment plan discussed with: patient      Measurements taken on 5/1.  Progress per Plan of Care           Timed:  Therapeutic Exercise:    30     mins  19908;     Therapeutic Activity:     8     mins  01850;       Timed Treatment:   38   mins   Total Treatment:     38   mins        Carlos Olsen OT  Occupational Therapist  [place] License: [id number]  NPI: [id number]